# Patient Record
Sex: FEMALE | Race: WHITE
[De-identification: names, ages, dates, MRNs, and addresses within clinical notes are randomized per-mention and may not be internally consistent; named-entity substitution may affect disease eponyms.]

---

## 2020-03-11 ENCOUNTER — HOSPITAL ENCOUNTER (OUTPATIENT)
Dept: HOSPITAL 33 - ED | Age: 65
Setting detail: OBSERVATION
LOS: 1 days | Discharge: HOME | End: 2020-03-12
Attending: GENERAL PRACTICE | Admitting: GENERAL PRACTICE
Payer: MEDICARE

## 2020-03-11 DIAGNOSIS — J09.X2: Primary | ICD-10-CM

## 2020-03-11 DIAGNOSIS — I10: ICD-10-CM

## 2020-03-11 DIAGNOSIS — D69.6: ICD-10-CM

## 2020-03-11 DIAGNOSIS — I73.9: ICD-10-CM

## 2020-03-11 DIAGNOSIS — D61.818: ICD-10-CM

## 2020-03-11 DIAGNOSIS — Z79.899: ICD-10-CM

## 2020-03-11 LAB
ALBUMIN SERPL-MCNC: 3.9 G/DL (ref 3.5–5)
ALP SERPL-CCNC: 67 U/L (ref 38–126)
ALT SERPL-CCNC: 21 U/L (ref 0–35)
ANION GAP SERPL CALC-SCNC: 11.9 MEQ/L (ref 5–15)
AST SERPL QL: 46 U/L (ref 14–36)
BASOPHILS # BLD AUTO: 0.01 10*3/UL (ref 0–0.4)
BASOPHILS NFR BLD AUTO: 0.3 % (ref 0–0.4)
BILIRUB BLD-MCNC: 0.5 MG/DL (ref 0.2–1.3)
BLD SMEAR INTERP: YES
BUN SERPL-MCNC: 14 MG/DL (ref 7–17)
CALCIUM SPEC-MCNC: 9 MG/DL (ref 8.4–10.2)
CHLORIDE SERPL-SCNC: 100 MMOL/L (ref 98–107)
CO2 SERPL-SCNC: 26 MMOL/L (ref 22–30)
CREAT SERPL-MCNC: 0.83 MG/DL (ref 0.52–1.04)
EOSINOPHIL # BLD AUTO: 0.03 10*3/UL (ref 0–0.5)
FLUAV AG NPH QL IA: POSITIVE
FLUBV AG NPH QL IA: NEGATIVE
GLUCOSE SERPL-MCNC: 77 MG/DL (ref 74–106)
GLUCOSE UR-MCNC: NEGATIVE MG/DL
HCT VFR BLD AUTO: 38.9 % (ref 35–47)
HGB BLD-MCNC: 13.4 GM/DL (ref 12–16)
LYMPHOCYTES # SPEC AUTO: 1.3 10*3/UL (ref 1–4.6)
MCH RBC QN AUTO: 31.1 PG (ref 26–32)
MCHC RBC AUTO-ENTMCNC: 34.4 G/DL (ref 32–36)
MONOCYTES # BLD AUTO: 0.44 10*3/UL (ref 0–1.3)
PLATELET # BLD AUTO: 99 K/MM3 (ref 150–450)
POTASSIUM SERPLBLD-SCNC: 3.9 MMOL/L (ref 3.5–5.1)
PROT SERPL-MCNC: 7.1 G/DL (ref 6.3–8.2)
PROT UR STRIP-MCNC: NEGATIVE MG/DL
RBC # BLD AUTO: 4.31 M/MM3 (ref 4.1–5.4)
RBC #/AREA URNS HPF: (no result) /HPF (ref 0–2)
RSV AG SPEC QL IA: NEGATIVE
SODIUM SERPL-SCNC: 134 MMOL/L (ref 137–145)
WBC # BLD AUTO: 4 K/MM3 (ref 4–10.5)
WBC #/AREA URNS HPF: (no result) /HPF (ref 0–5)

## 2020-03-11 PROCEDURE — 71045 X-RAY EXAM CHEST 1 VIEW: CPT

## 2020-03-11 PROCEDURE — 85027 COMPLETE CBC AUTOMATED: CPT

## 2020-03-11 PROCEDURE — 84484 ASSAY OF TROPONIN QUANT: CPT

## 2020-03-11 PROCEDURE — 94760 N-INVAS EAR/PLS OXIMETRY 1: CPT

## 2020-03-11 PROCEDURE — 85025 COMPLETE CBC W/AUTO DIFF WBC: CPT

## 2020-03-11 PROCEDURE — 85007 BL SMEAR W/DIFF WBC COUNT: CPT

## 2020-03-11 PROCEDURE — 80048 BASIC METABOLIC PNL TOTAL CA: CPT

## 2020-03-11 PROCEDURE — 80053 COMPREHEN METABOLIC PANEL: CPT

## 2020-03-11 PROCEDURE — 93041 RHYTHM ECG TRACING: CPT

## 2020-03-11 PROCEDURE — 36415 COLL VENOUS BLD VENIPUNCTURE: CPT

## 2020-03-11 PROCEDURE — 96360 HYDRATION IV INFUSION INIT: CPT

## 2020-03-11 PROCEDURE — G0378 HOSPITAL OBSERVATION PER HR: HCPCS

## 2020-03-11 PROCEDURE — 87631 RESP VIRUS 3-5 TARGETS: CPT

## 2020-03-11 PROCEDURE — 96374 THER/PROPH/DIAG INJ IV PUSH: CPT

## 2020-03-11 PROCEDURE — 99285 EMERGENCY DEPT VISIT HI MDM: CPT

## 2020-03-11 PROCEDURE — 81001 URINALYSIS AUTO W/SCOPE: CPT

## 2020-03-11 RX ADMIN — AMLODIPINE BESYLATE SCH MG: 5 TABLET ORAL at 22:21

## 2020-03-11 NOTE — XRAY
Indication: Cough.  Pneumonia.



Comparison: August 21, 2019.



Portable chest remains hyperinflated and clear with incidental right midlung

fibrosis/scarring and left costophrenic angle blunting.  Heart and mediastinal

structures within normal limits.  Bony thorax intact again with mild

degenerative changes and minimal scoliosis.



Impression: Continued nonacute hyperinflated chest with chronic features.

## 2020-03-11 NOTE — ERPHSYRPT
- History of Present Illness


Time Seen by Provider: 03/11/20 14:30


Source: patient


Exam Limitations: no limitations


Patient Subjective Stated Complaint: Pt began feeling sick on Sunday, cough, 

headache, diarrhea, chest hurting due to cough, short of breath


Triage Nursing Assessment: Pt brought to the ER by her son, hypertensive, lungs 

clear, rates head pain as 7/10, hx of aortic aneurysm, pulses normal, coughing, 

afebrile


Physician History: 





Patient is a 65-year-old female presents to our ED with complaints of 

generalized weakness.  Patient concerned she may have influenza.  Patient 

states a household member was recently diagnosed with influenza.  Patient does 

not have an appetite.  She admits to several bouts of loose stools.  She has a 

headache.  Headache is global.  No trauma.  Patient has no neck pain no nuchal 

rigidity no photophobia.  Patient has no meningeal signs.  No hematuria no 

dysuria.  No abdominal pain.  Symptoms have been constant.  No specific 

worsening or improving factors.  Patient voices no other complaints at this 

time.  Patient advises staff that she had a AAA repair approximately 6 months 

ago.  However there have been no issues regarding this repair and healing has 

been going as expected.


Timing/Duration: day(s) (Approximately 3 to 4 days.)


Cough Quality/Degree: moderate


Possible Cause: no prior episodes


Modifying Factors: Improves With: coughing


Associated Symptoms: cough (Patient occasionally experiences some chest 

soreness only while she coughs.)


Allergies/Adverse Reactions: 








morphine Allergy (Mild, Verified 03/11/20 14:39)


 rash/itching


fentanyl Adverse Reaction (Verified 03/11/20 14:40)


 





Home Medications: 








Multivit-Min/Iron/Folic/Lutein [Centrum Silver Women Tablet] 1 tab PO DAILY 07/ 26/16 [History]


Amlodipine Besylate 5 mg*** [Norvasc 5 mg***] 1 tab PO BID 07/29/16 [History]


Aspirin EC 81 mg*** [Ecotrin 81 mg***] 81 mg PO DAILY 03/11/20 [History]


Atorvastatin Calcium [Lipitor] 40 mg PO DAILY 03/11/20 [History]


PANTOPRAZOLE 40 mg Tablet*** [Protonix 40MG Tablet***] 40 mg PO QAM 03/11/20 [

History]


Sertraline HCl 50 mg PO DAILY 03/11/20 [History]


Valsartan [Diovan] 80 mg PO DAILY 03/11/20 [History]





Hx Influenza Vaccination/Date Given: Yes (2015)


Hx Pneumococcal Vaccination/Date Given: Yes (2014)





- Review of Systems


Constitutional: No Fever, No Chills


Eyes: No Symptoms


Ears, Nose, & Throat: No Symptoms


Respiratory: Cough, No Dyspnea


Cardiac: No Symptoms, No Chest Pain, No Edema, No Syncope


Abdominal/Gastrointestinal: No Symptoms, No Abdominal Pain, No Nausea, No 

Vomiting, No Diarrhea


Genitourinary Symptoms: No Symptoms, No Dysuria


Musculoskeletal: Other (Patient experiencing mild generalized myalgia.), No 

Back Pain, No Neck Pain


Skin: No Symptoms, No Rash


Neurological: No Symptoms, No Dizziness, No Focal Weakness, No Sensory Changes


Psychological: No Symptoms


Endocrine: No Symptoms


All Other Systems: Reviewed and Negative





- Past Medical History


Pertinent Past Medical History: Yes


Neurological History: No Pertinent History


ENT History: No Pertinent History


Cardiac History: Aneurysm, Hypertension, Other


Respiratory History: Pneumonia, Other


Endocrine Medical History: No Pertinent History


Musculoskeletal History: Fibromyalgia, Osteoporosis


GI Medical History: Polyps


 History: Other


Psycho-Social History: No Pertinent History


Female Reproductive Disorders: No Pertinent History


Other Medical History: has mvp nonmedication no problems recently. 3 months ago 

had halter monitor normal with Dr. Duncan. Sees Dr. Purcell once yearly for MVP 

due to see him in September. Had pnemonia 11 yrs ago and had collapsed lung and 

had lung surgery. has cystitis occassionally but no problems recently





- Past Surgical History


Past Surgical History: Yes


Neuro Surgical History: No Pertinent History


Cardiac: No Pertinent History


Respiratory: Chest Surgery, Other


Gastrointestinal: No Pertinent History


Genitourinary: Other


Musculoskeletal: No Pertinent History


Female Surgical History: Hysterectomy


Other Surgical History: hx of tonsillectomy, Lung surgery after collpased lung 

with pneumonia, bladder tied up with hysterectomy, marchil marchette





- Social History


Smoking Status: Current every day smoker


How long have you smoked: 30yrs


Exposure to second hand smoke: Yes


Drug Use: none


Patient Lives Alone: No





- Nursing Vital Signs


Nursing Vital Signs: 


 Initial Vital Signs











Temperature  98.2 F   03/11/20 14:25


 


Pulse Rate  85   03/11/20 14:25


 


Respiratory Rate  24   03/11/20 14:25


 


Blood Pressure  192/93   03/11/20 14:25


 


O2 Sat by Pulse Oximetry  95   03/11/20 14:25








 Pain Scale











Pain Intensity                 7

















- Physical Exam


General Appearance: no apparent distress, alert


Eye Exam: PERRL/EOMI, eyes nml inspection


Ears, Nose, Throat Exam: normal ENT inspection, TMs normal, pharynx normal, 

moist mucous membranes


Neck Exam: normal inspection, non-tender, supple, full range of motion


Respiratory Exam: normal breath sounds, lungs clear, No respiratory distress


Cardiovascular Exam: regular rate/rhythm, normal heart sounds


Gastrointestinal/Abdomen Exam: soft, No tenderness


Back Exam: normal inspection, No CVA tenderness, No vertebral tenderness


Extremity Exam: normal inspection, normal range of motion


Neurologic Exam: alert, oriented x 3, cooperative, normal mood/affect, 

sensation nml, No motor deficits


Skin Exam: normal color, warm, dry, No rash


Lymphatic Exam: No adenopathy


**SpO2 Interpretation**: normal


SpO2: 96


O2 Delivery: Room Air





- Course


EKG Interpreted by Me: RATE, NORMAL AXIS, NORMAL INTERVALS





- Radiology Exams


  ** Chest


X-ray Interpretation: Teleradiologist Report (Continued nonacute hyperinflated 

chest with chronic features)


Ordered Tests: 


 Active Orders 24 hr











 Category Date Time Status


 


 Cardiac Monitor STAT Care  03/11/20 14:51 Active


 


 Cardiac Monitor STAT Care  03/11/20 16:11 Active


 


 EKG-ER Only STAT Care  03/11/20 16:10 Active


 


 IV Insertion STAT Care  03/11/20 14:49 Active


 


 Pulse Oximetry (ED) STAT Care  03/11/20 14:49 Active


 


 CHEST 1 VIEW (PORTABLE) Stat Exams  03/11/20 14:51 Completed


 


 CBC W DIFF Stat Lab  03/11/20 16:10 Completed


 


 CMP Stat Lab  03/11/20 16:10 Completed


 


 TROPONIN Q3H Lab  03/11/20 Completed


 


 TROPONIN Q3H Lab  03/11/20 19:15 Ordered


 


 TROPONIN Q3H Lab  03/11/20 22:15 Ordered


 


 TROPONIN Q3H Lab  03/12/20 01:15 Ordered


 


 TROPONIN Q3H Lab  03/12/20 04:15 Ordered


 


 UA W/RFX UR CULTURE Stat Lab  03/11/20 15:43 Completed


 


 Transfer Order Routine Transfer  03/11/20 Ordered








Medication Summary














Discontinued Medications














Generic Name Dose Route Start Last Admin





  Trade Name Freq  PRN Reason Stop Dose Admin


 


Sodium Chloride  1,000 mls @ 999 mls/hr  03/11/20 14:49  03/11/20 16:30





  Sodium Chloride 0.9% 1000 Ml  IV  03/11/20 15:49  Infused





  .Q1H1M STA   Infusion





     





     





     





     


 


Sodium Chloride  Confirm  03/11/20 15:06  





  Sodium Chloride 0.9% 1000 Ml  Administered  03/11/20 15:07  





  Dose   





  1,000 mls @ ud   





  .ROUTE   





  .STK-MED ONE   





     





     





     





     


 


Ketorolac Tromethamine  30 mg  03/11/20 14:55  03/11/20 15:12





  Toradol 30 Mg Injection***  IV  03/11/20 14:56  30 mg





  STAT ONE   Administration





     





     





     





     


 


Ketorolac Tromethamine  Confirm  03/11/20 15:06  





  Toradol 30 Mg Injection***  Administered  03/11/20 15:07  





  Dose   





  30 mg   





  .ROUTE   





  .STK-MED ONE   





     





     





     





     


 


Oseltamivir Phosphate  75 mg  03/11/20 17:11  





  Tamiflu 75mg Capsule***  PO  03/11/20 17:12  





  STAT ONE   





     





     





     





     











Lab/Rad Data: 


 Laboratory Result Diagrams





 03/11/20 16:10 





 03/11/20 16:10 





 Laboratory Results











  03/11/20 03/11/20 03/11/20 Range/Units





  Unknown 16:10 16:10 


 


WBC    4.0  (4.0-10.5)  K/mm3


 


RBC    4.31  (4.1-5.4)  M/mm3


 


Hgb    13.4  (12.0-16.0)  gm/dl


 


Hct    38.9  (35-47)  %


 


MCV    90.3  ()  fl


 


MCH    31.1  (26-32)  pg


 


MCHC    34.4  (32-36)  g/dl


 


RDW    14.0  (11.5-14.0)  %


 


Plt Count    99 L  (150-450)  K/mm3


 


MPV    12.6 H  (7.5-11.0)  fl


 


Gran %    55.3  (36.0-66.0)  %


 


Eos # (Auto)    0.03  (0-0.5)  


 


Absolute Lymphs (auto)    1.30  (1.0-4.6)  


 


Absolute Monos (auto)    0.44  (0.0-1.3)  


 


Lymphocytes %    32.6  (24.0-44.0)  %


 


Monocytes %    11.0  (0.0-12.0)  %


 


Eosinophils %    0.8  (0.00-5.0)  %


 


Basophils %    0.3  (0.0-0.4)  %


 


Absolute Granulocytes    2.21  (1.4-6.9)  


 


Basophils #    0.01  (0-0.4)  


 


Sodium   134 L   (137-145)  mmol/L


 


Potassium   3.9   (3.5-5.1)  mmol/L


 


Chloride   100   ()  mmol/L


 


Carbon Dioxide   26   (22-30)  mmol/L


 


Anion Gap   11.9   (5-15)  MEQ/L


 


BUN   14   (7-17)  mg/dL


 


Creatinine   0.83   (0.52-1.04)  mg/dL


 


Estimated GFR   > 60.0   ML/MIN


 


Glucose   77   ()  mg/dL


 


Calcium   9.0   (8.4-10.2)  mg/dL


 


Total Bilirubin   0.50   (0.2-1.3)  mg/dL


 


AST   46 H   (14-36)  U/L


 


ALT   21   (0-35)  U/L


 


Alkaline Phosphatase   67   ()  U/L


 


Troponin I  < 0.012    (0.000-0.034)  ng/mL


 


Serum Total Protein   7.1   (6.3-8.2)  g/dL


 


Albumin   3.9   (3.5-5.0)  g/dL


 


Urine Color     (YELLOW)  


 


Urine Appearance     (CLEAR)  


 


Urine pH     (5-6)  


 


Ur Specific Gravity     (1.005-1.025)  


 


Urine Protein     (Negative)  


 


Urine Ketones     (NEGATIVE)  


 


Urine Blood     (0-5)  Lc/ul


 


Urine Nitrite     (NEGATIVE)  


 


Urine Bilirubin     (NEGATIVE)  


 


Urine Urobilinogen     (0-1)  mg/dL


 


Ur Leukocyte Esterase     (NEGATIVE)  


 


Urine WBC (Auto)     (0-5)  /HPF


 


Urine RBC (Auto)     (0-2)  /HPF


 


U Epithel Cells (Auto)     (FEW)  /HPF


 


Urine Bacteria (Auto)     (NEGATIVE)  /HPF


 


Urine Culture Reflexed     (NO)  


 


Urine Glucose     (NEGATIVE)  mg/dL


 


Influenza Type A Ag     (NEGATIVE)  


 


Influenza Type B Ag     (NEGATIVE)  


 


RSV (PCR)     (Negative)  


 


Slides for Path Review    YES  














  03/11/20 03/11/20 Range/Units





  15:43 14:58 


 


WBC    (4.0-10.5)  K/mm3


 


RBC    (4.1-5.4)  M/mm3


 


Hgb    (12.0-16.0)  gm/dl


 


Hct    (35-47)  %


 


MCV    ()  fl


 


MCH    (26-32)  pg


 


MCHC    (32-36)  g/dl


 


RDW    (11.5-14.0)  %


 


Plt Count    (150-450)  K/mm3


 


MPV    (7.5-11.0)  fl


 


Gran %    (36.0-66.0)  %


 


Eos # (Auto)    (0-0.5)  


 


Absolute Lymphs (auto)    (1.0-4.6)  


 


Absolute Monos (auto)    (0.0-1.3)  


 


Lymphocytes %    (24.0-44.0)  %


 


Monocytes %    (0.0-12.0)  %


 


Eosinophils %    (0.00-5.0)  %


 


Basophils %    (0.0-0.4)  %


 


Absolute Granulocytes    (1.4-6.9)  


 


Basophils #    (0-0.4)  


 


Sodium    (137-145)  mmol/L


 


Potassium    (3.5-5.1)  mmol/L


 


Chloride    ()  mmol/L


 


Carbon Dioxide    (22-30)  mmol/L


 


Anion Gap    (5-15)  MEQ/L


 


BUN    (7-17)  mg/dL


 


Creatinine    (0.52-1.04)  mg/dL


 


Estimated GFR    ML/MIN


 


Glucose    ()  mg/dL


 


Calcium    (8.4-10.2)  mg/dL


 


Total Bilirubin    (0.2-1.3)  mg/dL


 


AST    (14-36)  U/L


 


ALT    (0-35)  U/L


 


Alkaline Phosphatase    ()  U/L


 


Troponin I    (0.000-0.034)  ng/mL


 


Serum Total Protein    (6.3-8.2)  g/dL


 


Albumin    (3.5-5.0)  g/dL


 


Urine Color  YELLOW   (YELLOW)  


 


Urine Appearance  CLEAR   (CLEAR)  


 


Urine pH  6.0   (5-6)  


 


Ur Specific Gravity  1.006   (1.005-1.025)  


 


Urine Protein  NEGATIVE   (Negative)  


 


Urine Ketones  NEGATIVE   (NEGATIVE)  


 


Urine Blood  NEGATIVE   (0-5)  Lc/ul


 


Urine Nitrite  NEGATIVE   (NEGATIVE)  


 


Urine Bilirubin  NEGATIVE   (NEGATIVE)  


 


Urine Urobilinogen  NEGATIVE   (0-1)  mg/dL


 


Ur Leukocyte Esterase  SMALL   (NEGATIVE)  


 


Urine WBC (Auto)  3-5   (0-5)  /HPF


 


Urine RBC (Auto)  3-5   (0-2)  /HPF


 


U Epithel Cells (Auto)  RARE   (FEW)  /HPF


 


Urine Bacteria (Auto)  RARE   (NEGATIVE)  /HPF


 


Urine Culture Reflexed  NO   (NO)  


 


Urine Glucose  NEGATIVE   (NEGATIVE)  mg/dL


 


Influenza Type A Ag   POSITIVE  (NEGATIVE)  


 


Influenza Type B Ag   NEGATIVE  (NEGATIVE)  


 


RSV (PCR)   NEGATIVE  (Negative)  


 


Slides for Path Review    














- Progress


Progress: improved


Air Movement: fair


Progress Note: 





03/11/20 17:31


Patient reassessed.  She feels somewhat better however patient feels that she 

is too weak to go home.  Patient states that she has no appetite.  Her son is 

at bedside.  He is requesting that we admit patient until she is functioning 

back at her baseline.  Patient son states that she is just been getting weaker 

and weaker as the days progressed.  He is very concerned.


03/11/20 17:33


Case discussed with Dr. Tomlinson covering Dr. Henning who accepts admission to 

observation.  Plan of care discussed with patient.  She agrees to admission to 

[facility] for further evaluation and treatment.


Antibiotics given: No


Discussed with : Paola


Will see patient in: hospital (observation)


Counseled pt/family regarding: lab results, diagnosis, rad results, smoking 

cessation





- Departure


Departure Disposition: Home, Observation


Clinical Impression: 


 Influenza A, Generalized weakness





Condition: Stable


Critical Care Time: No


Referrals: 


PETRA HENNING [Primary Care Provider] -

## 2020-03-12 VITALS — HEART RATE: 67 BPM | DIASTOLIC BLOOD PRESSURE: 75 MMHG | SYSTOLIC BLOOD PRESSURE: 132 MMHG | OXYGEN SATURATION: 98 %

## 2020-03-12 LAB
ANION GAP SERPL CALC-SCNC: 7.9 MEQ/L (ref 5–15)
ANISOCYTOSIS BLD QL: (no result)
BUN SERPL-MCNC: 10 MG/DL (ref 7–17)
CALCIUM SPEC-MCNC: 8 MG/DL (ref 8.4–10.2)
CELLS COUNTED: 100
CHLORIDE SERPL-SCNC: 108 MMOL/L (ref 98–107)
CO2 SERPL-SCNC: 25 MMOL/L (ref 22–30)
CREAT SERPL-MCNC: 0.63 MG/DL (ref 0.52–1.04)
GLUCOSE SERPL-MCNC: 85 MG/DL (ref 74–106)
HCT VFR BLD AUTO: 35 % (ref 35–47)
HGB BLD-MCNC: 11.7 GM/DL (ref 12–16)
MANUAL DIF COMMENT BLD-IMP: (no result)
MCH RBC QN AUTO: 30.7 PG (ref 26–32)
MCHC RBC AUTO-ENTMCNC: 33.4 G/DL (ref 32–36)
NEUTS BAND # BLD MANUAL: 6 % (ref 0–2)
PLATELET # BLD AUTO: 73 K/MM3 (ref 150–450)
POTASSIUM SERPLBLD-SCNC: 3.7 MMOL/L (ref 3.5–5.1)
RBC # BLD AUTO: 3.81 M/MM3 (ref 4.1–5.4)
SODIUM SERPL-SCNC: 137 MMOL/L (ref 137–145)
TOXIC GRANULES BLD QL SMEAR: (no result)
WBC # BLD AUTO: 3.4 K/MM3 (ref 4–10.5)

## 2020-03-12 RX ADMIN — AMLODIPINE BESYLATE SCH MG: 5 TABLET ORAL at 09:16

## 2020-03-12 NOTE — SSS
ADMISSION DIAGNOSES: 

1) Influenza A. 

2) Thrombocytopenia. 

3) Peripheral artery disease. 

4) Hypertension. 



DISCHARGE DIAGNOSES: 

1) INFLUENZA A. 

2) PANCYTOPENIA.  

3) PERIPHERAL ARTERY DISEASE. 

4) HYPERTENSION. 



HISTORY OF PRESENT ILLNESS:  This is a 65 year old patient of Dr. Jacob who presented to 
the emergency department. She reports on  she started to have a cough that got worse 
the day of her presentation with shortness of breath. She had been exposed to a grandson 
who tested positive for influenza A. She has had her flu shot and pneumonia shot recently. 
She denies any fever at home but had chills, body aches and a headache. She reports she 
vomited on  not since then. She had diarrhea two nights ago. Her cough is productive 
of clear sputum. She reports it is some better since being in the hospital overnight. She 
felt generally very weak when she was in the emergency room, did not feel like she could 
take care of herself at home and so she was admitted overnight after testing positive for 
influenza A. She did well overnight. States her cough is better and controlled with the 
cough medications that we are giving her. She said she never had any chest pain. 



REVIEW OF SYSTEMS:  She reports she had some weight loss over the past eight years after 
her   but no weight loss in the past year and has actually increased her weight 
in the past year. She denies any night sweats at home. No fever. No easy bleeding. No easy 
bruising. No chest pain. 



PAST MEDICAL HISTORY:  She reports she had a positive SILVER and is scheduled to see her 
rheumatologist on 2020. She has history of abdominal aortic aneurysm of 4.8 cm that 
she had surgery on in 2019. Her cardiologist is Dr. Ordoñez. She has peripheral 
artery disease, mitral valve prolapse, fibromyalgia and hypertension. She denies any 
chronic obstructive pulmonary disease or asthma but then states that she does have a 
nebulizer machine at home. 



PAST SURGICAL HISTORY: Hysterectomy. Tonsillectomy. She had a left lung collapse from 
pneumonia in  and to have surgery for that. Recent abdominal aortic aneurysm surgery 
in 2019. 



MEDICATIONS:  Please see the home medication reconciliation form which I have reviewed.  



ALLERGIES:  MORPHINE. FENTANYL.



SOCIAL HISTORY:  She quit smoking two weeks ago. She lives with her son, daughter-in-law 
and their child. No alcohol or drug use. 



FAMILY HISTORY:  Her mother is  and  of pulmonary embolism. Her father is 
 and had arteriosclerosis and hypertension. 



PHYSICAL EXAMINATION: 

VITAL SIGNS:  Temperature current 98.3F, temperature max 98.3F, heart rate 66, respiratory 
rate 18, blood pressure 141/79. Oxygen saturation 97% on room air. 

GENERAL: The patient is a pleasant talkative lady sitting up in bed in no acute distress 
with occasional cough.

CVS: She has a regular rate and rhythm. No murmurs, gallops or rubs. 

CHEST: Clear to auscultation bilaterally. No crackles or wheezes.

ABDOMEN: Soft with a well healed midline scar, nontender, nondistended. 

SKIN: Warm, dry and intact. 

EXTREMITIES: No clubbing, cyanosis or edema. 



LABORATORY DATA AND TESTS:  On admission her PLT count was 99,000, white blood cell count 
4.0, hematocrit 38.9, hemoglobin 13.4. Repeat CBC this morning: White blood cell count 
3.4, hemoglobin 11.7, PLT count 73,000. She had a peripheral smear done by the 
pathologist. [facility] BMP within normal limits. AST slightly 
elevated on admission to 46, other liver tests normal. She had serial negative troponins. 
EKG normal sinus rhythm with no ST-T wave changes. She tested positive for influenza A, 
negative for B, negative for respiratory syncytial virus.  UA was negative. Chest x-ray 
was read as no acute disease. Please see the radiologist's dictation for the full report. 



ASSESSMENT AND PLAN:  

1) INFLUENZA A:  She was started on Tamiflu and will plan to continue that as an 
outpatient. I discussed with the patient side effects such as upset stomach or headache 
but she would like to continue taking that. She did have a flu immunization which is good. 
The patient was instructed to come back if she had trouble breathing, worsening symptoms 
or any concerns to follow up with her on Monday as her primary care doctor is out on 
medical leave. 

2) PANCYTOPENIA:  I discussed with Dr. Astorga. He suggested getting the peripheral 
smear which was read as possible myelodysplastic syndrome (MDS) but there did not seem to 
be any atypical cells. We are going to repeat her CBC on Monday. The patient knows to 
return if she has any blood in her stool, blood in her urine, easy bruising or easy 
bleeding. Most likely this mild suppression due to the influenza virus. 

3) PERIPHERAL ARTERY DISEASE: She was continued on her low dose aspirin. 

4) HYPERTENSION: Well controlled with her current medication. 

5) HISTORY OF TOBACCO USE:  She reports she quit two weeks ago. The patient was 
congratulated on this.

## 2020-03-12 NOTE — PCM.DCORD
- Discharge


Discharge Date: 03/12/20


Disposition: Home, Self-Care


Condition: Fair


Prescriptions: 


New


   Oseltamivir 75 mg*** [Tamiflu 75MG Capsule***] 75 mg PO BID #7 cap


   Benzonatate [Tessalon Perle] 200 mg PO TID PRN #40 capsule


     PRN Reason: Cough





Continue


   Multivit-Min/Iron/Folic/Lutein [Centrum Silver Women Tablet] 1 tab PO DAILY


   Amlodipine Besylate 5 mg*** [Norvasc 5 mg***] 1 tab PO BID


   Aspirin EC 81 mg*** [Ecotrin 81 mg***] 81 mg PO DAILY


   Valsartan [Diovan] 80 mg PO DAILY


   Atorvastatin Calcium [Lipitor] 40 mg PO HS


   PANTOPRAZOLE 40 mg Tablet*** [Protonix 40MG Tablet***] 40 mg PO QAM


   Sertraline HCl 50 mg PO HS


Outpatient Orders: 


CBC W DIFF Time Frame: 03/16/20, Location: LABORATORY


Additional Instructions: 


Follow up with Dr. Winn Monday as Dr. Cordon is out on medical leave.  Return 

to ER or clinic if blood in urine or stool. Trouble breathing, not keeping 

fluids down, vomiting, worsening symptoms or any other concerns. 


Follow up with: 


RINKU WINN [ACTIVE STAFF] - 1 Week

## 2022-06-07 ENCOUNTER — HOSPITAL ENCOUNTER (OUTPATIENT)
Dept: HOSPITAL 33 - SDC | Age: 67
Discharge: HOME | End: 2022-06-07
Attending: FAMILY MEDICINE
Payer: MEDICARE

## 2022-06-07 VITALS — OXYGEN SATURATION: 97 % | HEART RATE: 66 BPM | DIASTOLIC BLOOD PRESSURE: 79 MMHG | SYSTOLIC BLOOD PRESSURE: 171 MMHG

## 2022-06-07 DIAGNOSIS — K57.30: ICD-10-CM

## 2022-06-07 DIAGNOSIS — Z12.11: Primary | ICD-10-CM

## 2022-06-07 DIAGNOSIS — Z86.010: ICD-10-CM

## 2022-06-07 NOTE — OP
SURGERY DATE/TIME:   06/07/2022  0700    



PREOPERATIVE DIAGNOSIS:      Screening exam.



POSTOPERATIVE DIAGNOSIS:      Sigmoid diverticulosis otherwise normal colon. 



PROCEDURE:    Colonoscopy.



SURGEON:        Dr. Lavon Alston.



ANESTHESIA:    MAC. Medications given by anesthesia department. 



HISTORY:  The patient is a 67-year-old white female who reports she previously had colon 
polyps on her first colonoscopy. She reports her second one was clear. The patient now 
presents for screening colonoscopy. She was reappraised of the risks of the procedure 
including the risk of perforation, phlebitis, untoward reaction to medication, bleeding 
and missed lesions. The patient verbalized her understanding and desired to have the 
procedure performed.



DESCRIPTION OF PROCEDURE:  The patient was given the medications by the anesthesia 
department. She had continuous pulse oximetry, ECG monitoring, intermittent blood pressure 
monitoring during the examination. She was placed in the left lateral decubitus position. 
A digital rectal examination was performed and revealed normal anal sphincter tone and no 
masses.  The flexible Olympus pediatric colonoscope was used to intubate the rectum.  A 
view of the colon was developed sequentially to the cecum. Upon insertion and withdrawal 
was noted sigmoid diverticulosis otherwise no mucosal lesions were encountered. The scope 
was removed from the patient who tolerated the procedure well and was sent back to OP 
recovery in good condition. The prep was noted to be fair.